# Patient Record
Sex: FEMALE | Race: BLACK OR AFRICAN AMERICAN | URBAN - METROPOLITAN AREA
[De-identification: names, ages, dates, MRNs, and addresses within clinical notes are randomized per-mention and may not be internally consistent; named-entity substitution may affect disease eponyms.]

---

## 2020-02-06 ENCOUNTER — OFFICE VISIT (OUTPATIENT)
Dept: SURGERY | Age: 55
End: 2020-02-06

## 2020-02-06 DIAGNOSIS — E66.9 OBESITY, UNSPECIFIED CLASSIFICATION, UNSPECIFIED OBESITY TYPE, UNSPECIFIED WHETHER SERIOUS COMORBIDITY PRESENT: Primary | ICD-10-CM

## 2020-02-06 NOTE — PROGRESS NOTES
Patient attended a Medically Supervised Weight Loss New Patient Orientation today where we discussed:  - New Direction Very Low Calorie Diet details  - Medical Supervision  - Nutrition education  - Cost of Meal Replacements  - Policies and compliance required for program enrollment. Patients initial consultation with provider is tentatively scheduled for:  No future appointments. Patient attended orientation but did not stay to schedule initial consult. Patient has 6 months from today to schedule initial consult or will need to repeat orientation.

## 2024-04-29 ENCOUNTER — HOSPITAL ENCOUNTER (EMERGENCY)
Facility: HOSPITAL | Age: 59
Discharge: HOME | End: 2024-04-29
Attending: EMERGENCY MEDICINE
Payer: COMMERCIAL

## 2024-04-29 VITALS
WEIGHT: 178 LBS | OXYGEN SATURATION: 100 % | RESPIRATION RATE: 16 BRPM | BODY MASS INDEX: 32.76 KG/M2 | SYSTOLIC BLOOD PRESSURE: 138 MMHG | HEART RATE: 68 BPM | HEIGHT: 62 IN | TEMPERATURE: 97.9 F | DIASTOLIC BLOOD PRESSURE: 89 MMHG

## 2024-04-29 DIAGNOSIS — M79.604 RIGHT LEG PAIN: Primary | ICD-10-CM

## 2024-04-29 PROCEDURE — 99282 EMERGENCY DEPT VISIT SF MDM: CPT

## 2024-04-29 ASSESSMENT — LIFESTYLE VARIABLES
HOW MANY STANDARD DRINKS CONTAINING ALCOHOL DO YOU HAVE ON A TYPICAL DAY: PATIENT DOES NOT DRINK
HOW OFTEN DO YOU HAVE A DRINK CONTAINING ALCOHOL: NEVER

## 2024-04-29 ASSESSMENT — PAIN - FUNCTIONAL ASSESSMENT: PAIN_FUNCTIONAL_ASSESSMENT: 0-10

## 2024-04-29 ASSESSMENT — PAIN DESCRIPTION - FREQUENCY: FREQUENCY: INTERMITTENT

## 2024-04-29 ASSESSMENT — PAIN DESCRIPTION - LOCATION: LOCATION: LEG

## 2024-04-29 ASSESSMENT — PAIN DESCRIPTION - ORIENTATION: ORIENTATION: RIGHT;LOWER

## 2024-04-29 ASSESSMENT — PAIN SCALES - GENERAL: PAINLEVEL_OUTOF10: 7

## 2024-04-30 ENCOUNTER — HOSPITAL ENCOUNTER (OUTPATIENT)
Facility: HOSPITAL | Age: 59
Discharge: HOME | End: 2024-05-02
Payer: COMMERCIAL

## 2024-04-30 DIAGNOSIS — R52 PAIN: ICD-10-CM

## 2024-04-30 PROCEDURE — 93971 EXTREMITY STUDY: CPT

## 2024-04-30 ASSESSMENT — ENCOUNTER SYMPTOMS
EYES NEGATIVE: 1
GASTROINTESTINAL NEGATIVE: 1
RESPIRATORY NEGATIVE: 1

## 2024-04-30 NOTE — ED NOTES
[facility] EMERGENCY DEPT  EMERGENCY DEPARTMENT ENCOUNTER      Pt Name: Tayla Ac  MRN: [id number]  Birthdate 1965  Date of evaluation: 2024  Provider: Jass Grimes MD  6:43 AM    CHIEF COMPLAINT       Chief Complaint   Patient presents with    Leg Pain         HISTORY OF PRESENT ILLNESS    Tayla Ac is a 58 y.o. female who presents to the emergency department with complaint of right lateral lower leg pain that started 4 days ago.  She denies posterior medial calf pain or swelling.  She had a long car ride 5 days ago.  No history of DVTs or PE.      Nursing Notes were reviewed.    REVIEW OF SYSTEMS       Review of Systems   Constitutional: Negative.    HENT: Negative.     Eyes: Negative.    Respiratory: Negative.     Cardiovascular: Negative.    Gastrointestinal: Negative.    Genitourinary: Negative.    Musculoskeletal:         Right lateral lower leg pain   Neurological: Negative.    Hematological: Negative.        Except as noted above the remainder of the review of systems was reviewed and negative.       PAST MEDICAL HISTORY     Past Medical History:   Diagnosis Date    Fibromyalgia     Hypovitaminosis D     Palpitations     had a stress test and cath that were normal    Sarcoidosis     Vitamin B12 deficiency          SURGICAL HISTORY       Past Surgical History:   Procedure Laterality Date     SECTION      CHOLECYSTECTOMY      GASTRIC BYPASS SURGERY      HERNIA REPAIR      incisional hernia    TONSILLECTOMY  1989    TUBAL LIGATION           CURRENT MEDICATIONS       Discharge Medication List as of 2024  8:53 PM        CONTINUE these medications which have NOT CHANGED    Details   CALCIUM PO Take by mouth 2 times dailyHistorical Med      Multiple Vitamin (MULTIVITAMIN PO) Take by mouthHistorical Med      ergocalciferol (ERGOCALCIFEROL) 1.25 MG (13613 UT) capsule Take 50,000 Units by mouth every 7 daysHistorical Med      ferrous sulfate (SLOW FE)

## 2024-04-30 NOTE — ED TRIAGE NOTES
Patient was seen at [facility] for right lower leg pain.  Patient was sent to ER to r/o DVT.  Pain started Friday.  Pt does report having a long car ride to [place] on Wednesday.  No swelling noted to lower leg.

## 2024-09-27 ENCOUNTER — HOSPITAL ENCOUNTER (EMERGENCY)
Facility: HOSPITAL | Age: 59
Discharge: HOME | End: 2024-09-27
Attending: EMERGENCY MEDICINE
Payer: COMMERCIAL

## 2024-09-27 VITALS
WEIGHT: 166.2 LBS | TEMPERATURE: 97.8 F | HEIGHT: 62 IN | DIASTOLIC BLOOD PRESSURE: 87 MMHG | BODY MASS INDEX: 30.59 KG/M2 | RESPIRATION RATE: 20 BRPM | HEART RATE: 76 BPM | OXYGEN SATURATION: 100 % | SYSTOLIC BLOOD PRESSURE: 143 MMHG

## 2024-09-27 DIAGNOSIS — K21.9 GASTROESOPHAGEAL REFLUX DISEASE WITHOUT ESOPHAGITIS: Primary | ICD-10-CM

## 2024-09-27 LAB
EKG ATRIAL RATE: 79 BPM
EKG DIAGNOSIS: NORMAL
EKG P AXIS: 55 DEGREES
EKG P-R INTERVAL: 132 MS
EKG Q-T INTERVAL: 368 MS
EKG QRS DURATION: 98 MS
EKG QTC CALCULATION (BAZETT): 420 MS
EKG R AXIS: 5 DEGREES
EKG T AXIS: 29 DEGREES
EKG VENTRICULAR RATE: 78 BPM

## 2024-09-27 PROCEDURE — 99284 EMERGENCY DEPT VISIT MOD MDM: CPT

## 2024-09-27 PROCEDURE — 93005 ELECTROCARDIOGRAM TRACING: CPT | Performed by: EMERGENCY MEDICINE

## 2024-09-27 ASSESSMENT — PAIN - FUNCTIONAL ASSESSMENT: PAIN_FUNCTIONAL_ASSESSMENT: NONE - DENIES PAIN

## 2024-09-27 ASSESSMENT — PAIN SCALES - GENERAL: PAINLEVEL_OUTOF10: 0

## 2024-09-28 NOTE — ED TRIAGE NOTES
Pt states she noticed her R leg was larger than her Left and had increased pain in her knee. Denies redness/warmth/discoloration to any area. Pt also states she has a hx of acid reflux and noticed she had some chest \"discomfort\" that started yesterday and comes and goes.

## 2024-09-28 NOTE — ED PROVIDER NOTES
[facility] EMERGENCY DEPT  EMERGENCY DEPARTMENT HISTORY AND PHYSICAL EXAM      Date: 2024  Patient Name: Tayla Ac  MRN: [id number]  Birthdate 1965  Date of evaluation: 2024  Provider: Bulmaro Salinas MD   Note Started: 9:31 PM EDT 24    HISTORY OF PRESENT ILLNESS     Chief Complaint   Patient presents with    Leg Pain    Rib Pain (injury)       History Provided By: Patient    HPI: Tayla Ac is a 59 y.o. female presents with a chief complaint of knee painon the right for th past year and she also has a history of reflux and has had reflux since last night and states that it continues today.  Its worse with laying flat and better with sitting up.  She also has some right chronic knee pain.  There are no other exacerbating or relieving factors and she has no symptoms at this time.    PAST MEDICAL HISTORY   Past Medical History:  Past Medical History:   Diagnosis Date    Fibromyalgia     Hypovitaminosis D     Palpitations     had a stress test and cath that were normal    Sarcoidosis     Vitamin B12 deficiency        Past Surgical History:  Past Surgical History:   Procedure Laterality Date     SECTION      CHOLECYSTECTOMY      GASTRIC BYPASS SURGERY      HERNIA REPAIR      incisional hernia    TONSILLECTOMY      TUBAL LIGATION         Family History:  Family History   Problem Relation Age of Onset    Heart Attack Father 42    Heart Failure Father     Alcohol Abuse Father     Hypertension Mother     Kidney Disease Mother     Osteoarthritis Paternal Grandmother     Diabetes Paternal Grandmother     Hypertension Paternal Grandmother     Cancer Maternal Grandfather         throat    Alcohol Abuse Maternal Grandfather     Hypertension Maternal Grandmother     Stroke Maternal Grandmother     Osteoporosis Maternal Grandmother     Osteoarthritis Maternal Grandmother        Social History:  Social History     Tobacco Use    Smoking status: Former      Ventricular Rate 78 BPM    Atrial Rate 79 BPM    P-R Interval 132 ms    QRS Duration 98 ms    Q-T Interval 368 ms    QTc Calculation (Bazett) 420 ms    P Axis 55 degrees    R Axis 5 degrees    T Axis 29 degrees    Diagnosis Sinus rhythm  Low voltage, precordial leads          EKG:.EKG interpreted by me. Shows Normal Sinus Rhythm with a HR of 78.  No STEMI.    Radiologic Studies:  Non-plain film images such as CT, Ultrasound and MRI are read by the radiologist. Plain radiographic images are visualized and preliminarily interpreted by the ED Provider with the following findings: Not Applicable.    Interpretation per the Radiologist below, if available at the time of this note:  No orders to display        ED COURSE and DIFFERENTIAL DIAGNOSIS/MDM   9:44 PM Differential and Considerations: EKG wthin normal limits and the patient is asymptomatic    Records Reviewed (source and summary of external notes): Prior medical records and Nursing notes.    Vitals:    Vitals:    09/27/24 2135   BP: 137/87   Pulse: 84   Resp: 13   Temp: 97.8 °F (36.6 °C)   TempSrc: Oral   SpO2: 100%   Weight: 75.4 kg (166 lb 3.2 oz)   Height: 1.575 m (5' 2\")        ED COURSE       SEPSIS Reassessment: Sepsis reassessment not applicable    Clinical Management Tools:      Patient was given the following medications:  Medications - No data to display    CONSULTS: See ED Course/MDM for further details.  None     Social Determinants affecting Diagnosis/Treatment: None    Smoking Cessation: Not Applicable    PROCEDURES   Unless otherwise noted above, none  Procedures      CRITICAL CARE TIME   Patient does not meet Critical Care Time, 0 minutes    ED IMPRESSION     1. Gastroesophageal reflux disease without esophagitis          DISPOSITION/PLAN   DISPOSITION Decision To Discharge 09/27/2024 09:34:30 PM  Condition at Disposition: Data Unavailable    Discharge Note: The patient is stable for discharge home. The signs, symptoms, diagnosis, and discharge

## 2024-11-10 ENCOUNTER — APPOINTMENT (OUTPATIENT)
Facility: HOSPITAL | Age: 59
End: 2024-11-10
Attending: EMERGENCY MEDICINE
Payer: COMMERCIAL

## 2024-11-10 ENCOUNTER — HOSPITAL ENCOUNTER (EMERGENCY)
Facility: HOSPITAL | Age: 59
Discharge: HOME | End: 2024-11-10
Attending: EMERGENCY MEDICINE
Payer: COMMERCIAL

## 2024-11-10 VITALS
SYSTOLIC BLOOD PRESSURE: 146 MMHG | OXYGEN SATURATION: 100 % | RESPIRATION RATE: 16 BRPM | WEIGHT: 165 LBS | DIASTOLIC BLOOD PRESSURE: 108 MMHG | TEMPERATURE: 97.8 F | HEART RATE: 100 BPM | HEIGHT: 62 IN | BODY MASS INDEX: 30.36 KG/M2

## 2024-11-10 DIAGNOSIS — M75.52 ACUTE BURSITIS OF LEFT SHOULDER: Primary | ICD-10-CM

## 2024-11-10 PROCEDURE — 99283 EMERGENCY DEPT VISIT LOW MDM: CPT

## 2024-11-10 PROCEDURE — 73030 X-RAY EXAM OF SHOULDER: CPT

## 2024-11-10 PROCEDURE — 6360000002 HC RX W HCPCS: Performed by: EMERGENCY MEDICINE

## 2024-11-10 RX ORDER — BUPIVACAINE HYDROCHLORIDE 5 MG/ML
30 INJECTION, SOLUTION EPIDURAL; INTRACAUDAL
Status: COMPLETED | OUTPATIENT
Start: 2024-11-10 | End: 2024-11-10

## 2024-11-10 RX ORDER — HYDROCODONE BITARTRATE AND ACETAMINOPHEN 5; 325 MG/1; MG/1
1 TABLET ORAL EVERY 6 HOURS PRN
Qty: 10 TABLET | Refills: 0 | Status: SHIPPED | OUTPATIENT
Start: 2024-11-10 | End: 2024-11-13

## 2024-11-10 RX ORDER — METHYLPREDNISOLONE ACETATE 40 MG/ML
40 INJECTION, SUSPENSION INTRA-ARTICULAR; INTRALESIONAL; INTRAMUSCULAR; SOFT TISSUE ONCE
Status: COMPLETED | OUTPATIENT
Start: 2024-11-10 | End: 2024-11-10

## 2024-11-10 RX ADMIN — METHYLPREDNISOLONE ACETATE 40 MG: 40 INJECTION, SUSPENSION INTRA-ARTICULAR; INTRALESIONAL; INTRAMUSCULAR; INTRASYNOVIAL; SOFT TISSUE at 13:26

## 2024-11-10 RX ADMIN — BUPIVACAINE HYDROCHLORIDE 150 MG: 5 INJECTION, SOLUTION EPIDURAL; INTRACAUDAL; PERINEURAL at 13:26

## 2024-11-10 ASSESSMENT — PAIN - FUNCTIONAL ASSESSMENT
PAIN_FUNCTIONAL_ASSESSMENT: 0-10
PAIN_FUNCTIONAL_ASSESSMENT: 0-10

## 2024-11-10 ASSESSMENT — PAIN SCALES - GENERAL
PAINLEVEL_OUTOF10: 10
PAINLEVEL_OUTOF10: 10
PAINLEVEL_OUTOF10: 5

## 2024-11-10 NOTE — ED TRIAGE NOTES
Pt reports left shoulder pain beginning on 11/5/2024. Pt states he has limited ROM to same, Pain is 10/10. Pt denies injury. Pt has hx of frozen shoulder to right laterality and states she has upcoming ortho appt.

## 2024-11-10 NOTE — ED PROVIDER NOTES
Fluoxetine, Iron dextran, Oxycodone, Acetaminophen-codeine, Duloxetine, Nitrofurantoin, Sulfa antibiotics, and Tramadol    FAMILY HISTORY       Family History   Problem Relation Age of Onset    Heart Attack Father 42    Heart Failure Father     Alcohol Abuse Father     Hypertension Mother     Kidney Disease Mother     Osteoarthritis Paternal Grandmother     Diabetes Paternal Grandmother     Hypertension Paternal Grandmother     Cancer Maternal Grandfather         throat    Alcohol Abuse Maternal Grandfather     Hypertension Maternal Grandmother     Stroke Maternal Grandmother     Osteoporosis Maternal Grandmother     Osteoarthritis Maternal Grandmother           SOCIAL HISTORY       Social History     Socioeconomic History    Marital status: Single     Spouse name: None    Number of children: None    Years of education: None    Highest education level: None   Tobacco Use    Smoking status: Former     Current packs/day: 0.00     Types: Cigarettes     Quit date: 1996     Years since quittin.8    Smokeless tobacco: Never    Tobacco comments:     Quit smoking: quit smoking in    Substance and Sexual Activity    Alcohol use: Yes    Drug use: No       SCREENINGS         [place] Coma Scale  Eye Opening: Spontaneous  Best Verbal Response: Oriented  Best Motor Response: Obeys commands  [place] Coma Scale Score: 15                     CIWA Assessment  BP: (!) 146/108  Pulse: 100                 PHYSICAL EXAM    (up to 7 for level 4, 8 or more for level 5)     ED Triage Vitals [11/10/24 1248]   BP Systolic BP Percentile Diastolic BP Percentile Temp Temp src Pulse Respirations SpO2   (!) 146/108 -- -- 97.8 °F (36.6 °C) -- 100 18 98 %      Height Weight - Scale         1.575 m (5' 2\") 74.8 kg (165 lb)           Pleasant middle-age AA female in moderate pain  Physical Exam  Vitals and nursing note reviewed.   Constitutional:       General: She is not in acute distress.     Appearance: Normal appearance. She is normal